# Patient Record
Sex: FEMALE | Race: AMERICAN INDIAN OR ALASKA NATIVE | NOT HISPANIC OR LATINO | URBAN - METROPOLITAN AREA
[De-identification: names, ages, dates, MRNs, and addresses within clinical notes are randomized per-mention and may not be internally consistent; named-entity substitution may affect disease eponyms.]

---

## 2024-01-12 ENCOUNTER — OFFICE VISIT (OUTPATIENT)
Dept: FAMILY MEDICINE | Facility: CLINIC | Age: 39
End: 2024-01-12
Payer: COMMERCIAL

## 2024-01-12 VITALS
SYSTOLIC BLOOD PRESSURE: 124 MMHG | DIASTOLIC BLOOD PRESSURE: 79 MMHG | OXYGEN SATURATION: 96 % | TEMPERATURE: 98.6 F | HEART RATE: 73 BPM | WEIGHT: 235.4 LBS | BODY MASS INDEX: 37.83 KG/M2 | RESPIRATION RATE: 16 BRPM | HEIGHT: 66 IN

## 2024-01-12 DIAGNOSIS — F15.10 METHAMPHETAMINE USE (H): ICD-10-CM

## 2024-01-12 DIAGNOSIS — N89.8 VAGINAL DISCHARGE: ICD-10-CM

## 2024-01-12 DIAGNOSIS — Z11.3 ROUTINE SCREENING FOR STI (SEXUALLY TRANSMITTED INFECTION): ICD-10-CM

## 2024-01-12 DIAGNOSIS — R07.0 THROAT PAIN: Primary | ICD-10-CM

## 2024-01-12 LAB
BACTERIAL VAGINOSIS VAG-IMP: NEGATIVE
BASOPHILS # BLD AUTO: 0 10E3/UL (ref 0–0.2)
BASOPHILS NFR BLD AUTO: 0 %
CANDIDA DNA VAG QL NAA+PROBE: NOT DETECTED
CANDIDA GLABRATA / CANDIDA KRUSEI DNA: NOT DETECTED
DEPRECATED S PYO AG THROAT QL EIA: NEGATIVE
EOSINOPHIL # BLD AUTO: 0 10E3/UL (ref 0–0.7)
EOSINOPHIL NFR BLD AUTO: 1 %
ERYTHROCYTE [DISTWIDTH] IN BLOOD BY AUTOMATED COUNT: 15.2 % (ref 10–15)
GROUP A STREP BY PCR: NOT DETECTED
HCT VFR BLD AUTO: 43 % (ref 35–47)
HGB BLD-MCNC: 13.5 G/DL (ref 11.7–15.7)
IMM GRANULOCYTES # BLD: 0 10E3/UL
IMM GRANULOCYTES NFR BLD: 0 %
LYMPHOCYTES # BLD AUTO: 1.4 10E3/UL (ref 0.8–5.3)
LYMPHOCYTES NFR BLD AUTO: 28 %
MCH RBC QN AUTO: 27.3 PG (ref 26.5–33)
MCHC RBC AUTO-ENTMCNC: 31.4 G/DL (ref 31.5–36.5)
MCV RBC AUTO: 87 FL (ref 78–100)
MONOCYTES # BLD AUTO: 0.4 10E3/UL (ref 0–1.3)
MONOCYTES NFR BLD AUTO: 9 %
NEUTROPHILS # BLD AUTO: 3 10E3/UL (ref 1.6–8.3)
NEUTROPHILS NFR BLD AUTO: 62 %
NRBC # BLD AUTO: 0 10E3/UL
NRBC BLD AUTO-RTO: 0 /100
PLATELET # BLD AUTO: 242 10E3/UL (ref 150–450)
RBC # BLD AUTO: 4.94 10E6/UL (ref 3.8–5.2)
T VAGINALIS DNA VAG QL NAA+PROBE: NOT DETECTED
WBC # BLD AUTO: 4.9 10E3/UL (ref 4–11)

## 2024-01-12 PROCEDURE — 87491 CHLMYD TRACH DNA AMP PROBE: CPT | Mod: ORL | Performed by: NURSE PRACTITIONER

## 2024-01-12 PROCEDURE — 87389 HIV-1 AG W/HIV-1&-2 AB AG IA: CPT | Mod: ORL | Performed by: NURSE PRACTITIONER

## 2024-01-12 PROCEDURE — 87651 STREP A DNA AMP PROBE: CPT | Mod: ORL | Performed by: NURSE PRACTITIONER

## 2024-01-12 PROCEDURE — 80053 COMPREHEN METABOLIC PANEL: CPT | Mod: ORL | Performed by: NURSE PRACTITIONER

## 2024-01-12 PROCEDURE — 85025 COMPLETE CBC W/AUTO DIFF WBC: CPT | Mod: ORL | Performed by: NURSE PRACTITIONER

## 2024-01-12 PROCEDURE — 86780 TREPONEMA PALLIDUM: CPT | Mod: ORL | Performed by: NURSE PRACTITIONER

## 2024-01-12 PROCEDURE — 87635 SARS-COV-2 COVID-19 AMP PRB: CPT | Mod: ORL | Performed by: NURSE PRACTITIONER

## 2024-01-12 PROCEDURE — 0352U MULTIPLEX VAGINAL PANEL BY PCR: CPT | Mod: ORL | Performed by: NURSE PRACTITIONER

## 2024-01-12 PROCEDURE — 87591 N.GONORRHOEAE DNA AMP PROB: CPT | Mod: ORL | Performed by: NURSE PRACTITIONER

## 2024-01-12 RX ORDER — ACETAMINOPHEN 160 MG
1 TABLET,DISINTEGRATING ORAL DAILY
COMMUNITY

## 2024-01-12 RX ORDER — MULTIVITAMIN,THERAPEUTIC
1 TABLET ORAL DAILY
COMMUNITY

## 2024-01-12 RX ORDER — BUPROPION HYDROCHLORIDE 150 MG/1
150 TABLET ORAL EVERY MORNING
COMMUNITY
End: 2024-04-24

## 2024-01-12 RX ORDER — NALTREXONE HYDROCHLORIDE 50 MG/1
50 TABLET, FILM COATED ORAL DAILY
COMMUNITY

## 2024-01-12 RX ORDER — TRAZODONE HYDROCHLORIDE 50 MG/1
1-3 TABLET, FILM COATED ORAL
COMMUNITY
End: 2024-04-24

## 2024-01-12 RX ORDER — IBUPROFEN 200 MG
400 TABLET ORAL EVERY 6 HOURS PRN
Qty: 30 TABLET | Refills: 1 | Status: SHIPPED | OUTPATIENT
Start: 2024-01-12 | End: 2024-04-24

## 2024-01-12 ASSESSMENT — PATIENT HEALTH QUESTIONNAIRE - PHQ9
SUM OF ALL RESPONSES TO PHQ QUESTIONS 1-9: 10
5. POOR APPETITE OR OVEREATING: MORE THAN HALF THE DAYS

## 2024-01-12 ASSESSMENT — ENCOUNTER SYMPTOMS
SHORTNESS OF BREATH: 0
FEVER: 1
ABDOMINAL PAIN: 0
CHILLS: 0
NAUSEA: 0
SORE THROAT: 1
VOMITING: 0
FATIGUE: 1
PALPITATIONS: 0
DIARRHEA: 0
SINUS PRESSURE: 1
COUGH: 0

## 2024-01-12 ASSESSMENT — ANXIETY QUESTIONNAIRES
5. BEING SO RESTLESS THAT IT IS HARD TO SIT STILL: MORE THAN HALF THE DAYS
6. BECOMING EASILY ANNOYED OR IRRITABLE: NOT AT ALL
7. FEELING AFRAID AS IF SOMETHING AWFUL MIGHT HAPPEN: NOT AT ALL
3. WORRYING TOO MUCH ABOUT DIFFERENT THINGS: MORE THAN HALF THE DAYS
GAD7 TOTAL SCORE: 8
GAD7 TOTAL SCORE: 8
2. NOT BEING ABLE TO STOP OR CONTROL WORRYING: MORE THAN HALF THE DAYS
IF YOU CHECKED OFF ANY PROBLEMS ON THIS QUESTIONNAIRE, HOW DIFFICULT HAVE THESE PROBLEMS MADE IT FOR YOU TO DO YOUR WORK, TAKE CARE OF THINGS AT HOME, OR GET ALONG WITH OTHER PEOPLE: SOMEWHAT DIFFICULT
1. FEELING NERVOUS, ANXIOUS, OR ON EDGE: NOT AT ALL

## 2024-01-12 ASSESSMENT — PAIN SCALES - GENERAL: PAINLEVEL: NO PAIN (0)

## 2024-01-12 NOTE — LETTER
January 15, 2024      Hailey Peng  [address] [address]        Dear ,    We are writing to inform you of your test results.    Your strep and COVID were negative. Please follow-up if your sore throat does not resolve/improve after 10 days.    Your vaginal discharge tests came back negative. Please follow-up if symptoms persist/fail to improve.    The rest of your test results fall within the expected range(s) or remain unchanged from previous results.  Please continue with current treatment plan.    Resulted Orders   Streptococcus A Rapid Screen w/Reflex to PCR   Result Value Ref Range    Group A Strep antigen Negative Negative   Group A Streptococcus PCR Throat Swab   Result Value Ref Range    Group A strep by PCR Not Detected Not Detected    Narrative    The Xpert Xpress Strep A test, performed on the [employer] Systems, is a rapid, qualitative in vitro diagnostic test for the detection of Streptococcus pyogenes (Group A ß-hemolytic Streptococcus, Strep A) in throat swab specimens from patients with signs and symptoms of pharyngitis. The Xpert Xpress Strep A test can be used as an aid in the diagnosis of Group A Streptococcal pharyngitis. The assay is not intended to monitor treatment for Group A Streptococcus infections. The Xpert Xpress Strep A test utilizes an automated real-time polymerase chain reaction (PCR) to detect Streptococcus pyogenes DNA.   Symptomatic COVID-19 Virus (Coronavirus) by PCR Nose   Result Value Ref Range    SARS CoV2 PCR Negative Negative      Comment:      NEGATIVE: SARS-CoV-2 (COVID-19) RNA not detected, presumed negative.    Narrative    Testing was performed using the alfredo SARS-CoV-2 assay on the alfredo  6800 System. This test should be ordered for the detection of  SARS-CoV-2 in individuals who meet SARS-CoV-2 clinical and/or  epidemiological criteria. Test performance is unknown in asymptomatic  patients. This test is for in vitro  diagnostic use under the FDA EUA  for laboratories certified under CLIA to perform high and/or moderate  complexity testing. This test has not been FDA cleared or approved. A  negative result does not rule out the presence of PCR inhibitors in  the specimen or target RNA in concentration below the limit of  detection for the assay. The possibility of a false negative should  be considered if the patient's recent exposure or clinical  presentation suggests COVID-19. This test was validated by the [facility]. This  laboratory is certified under the Clinical Laboratory Improvement  Amendments of 1988 (CLIA-88) as qualified to perform high and/or  moderate complexity laboratory testing.   CHLAMYDIA TRACHOMATIS PCR   Result Value Ref Range    Chlamydia trachomatis Negative Negative      Comment:      A negative result by transcription mediated amplification does not preclude the presence of C. trachomatis infection because results are dependent on proper and adequate collection, absence of inhibitors and sufficient rRNA to be detected.   NEISSERIA GONORRHOEA PCR   Result Value Ref Range    Neisseria gonorrhoeae Negative Negative      Comment:      Negative for N. gonorrhoeae rRNA by transcription mediated amplification. A negative result by transcription mediated amplification does not preclude the presence of C. trachomatis infection because results are dependent on proper and adequate collection, absence of inhibitors and sufficient rRNA to be detected.   HIV Antigen Antibody Combo   Result Value Ref Range    HIV Antigen Antibody Combo Nonreactive Nonreactive      Comment:      Negative HIV-1/-2 antigen and antibody screening test results usually indicate the absence of HIV-1 and HIV-2 infection. However, such negative results do not rule-out acute HIV infection.  If acute HIV-1 or HIV-2 infection is suspected, detection of HIV-1 or HIV-2 RNA  is recommended.     Treponema Abs w Reflex to RPR and Titer   Result Value Ref Range    Treponema Antibody Total Nonreactive Nonreactive   Comprehensive metabolic panel   Result Value Ref Range    Sodium 141 135 - 145 mmol/L      Comment:      Reference intervals for this test were updated on 09/26/2023 to more accurately reflect our healthy population. There may be differences in the flagging of prior results with similar values performed with this method. Interpretation of those prior results can be made in the context of the updated reference intervals.     Potassium 4.1 3.4 - 5.3 mmol/L    Carbon Dioxide (CO2) 26 22 - 29 mmol/L    Anion Gap 11 7 - 15 mmol/L    Urea Nitrogen 13.9 6.0 - 20.0 mg/dL    Creatinine 0.82 0.51 - 0.95 mg/dL    GFR Estimate >90 >60 mL/min/1.73m2    Calcium 8.7 8.6 - 10.0 mg/dL    Chloride 104 98 - 107 mmol/L    Glucose 94 70 - 99 mg/dL    Alkaline Phosphatase 63 40 - 150 U/L      Comment:      Reference intervals for this test were updated on 11/14/2023 to more accurately reflect our healthy population. There may be differences in the flagging of prior results with similar values performed with this method. Interpretation of those prior results can be made in the context of the updated reference intervals.    AST 19 0 - 45 U/L      Comment:      Reference intervals for this test were updated on 6/12/2023 to more accurately reflect our healthy population. There may be differences in the flagging of prior results with similar values performed with this method. Interpretation of those prior results can be made in the context of the updated reference intervals.    ALT 14 0 - 50 U/L      Comment:      Reference intervals for this test were updated on 6/12/2023 to more accurately reflect our healthy population. There may be differences in the flagging of prior results with similar values performed with this method. Interpretation of those prior results can be made in the context of the updated reference  intervals.      Protein Total 7.1 6.4 - 8.3 g/dL    Albumin 4.1 3.5 - 5.2 g/dL    Bilirubin Total <0.2 <=1.2 mg/dL   CBC with platelets and differential   Result Value Ref Range    WBC Count 4.9 4.0 - 11.0 10e3/uL    RBC Count 4.94 3.80 - 5.20 10e6/uL    Hemoglobin 13.5 11.7 - 15.7 g/dL    Hematocrit 43.0 35.0 - 47.0 %    MCV 87 78 - 100 fL    MCH 27.3 26.5 - 33.0 pg    MCHC 31.4 (L) 31.5 - 36.5 g/dL    RDW 15.2 (H) 10.0 - 15.0 %    Platelet Count 242 150 - 450 10e3/uL    % Neutrophils 62 %    % Lymphocytes 28 %    % Monocytes 9 %    % Eosinophils 1 %    % Basophils 0 %    % Immature Granulocytes 0 %    NRBCs per 100 WBC 0 <1 /100    Absolute Neutrophils 3.0 1.6 - 8.3 10e3/uL    Absolute Lymphocytes 1.4 0.8 - 5.3 10e3/uL    Absolute Monocytes 0.4 0.0 - 1.3 10e3/uL    Absolute Eosinophils 0.0 0.0 - 0.7 10e3/uL    Absolute Basophils 0.0 0.0 - 0.2 10e3/uL    Absolute Immature Granulocytes 0.0 <=0.4 10e3/uL    Absolute NRBCs 0.0 10e3/uL   Multiplex Vaginal Panel by PCR   Result Value Ref Range    Bacterial Vaginosis Organism DNA Negative Negative      Comment:      Indicator DNA target(s) related to bacterial vaginosis organisms is/are not detected.  Organisms associated with bacterial vaginosis that are targeted in this assay include Atopobium spp., Bacterial Vaginosis-Associated Bacterium-2, and Megasphaera-1. Detected organisms are not reported individually.    Candida Group DNA Not Detected Not Detected      Comment:      Candida group species detected by this target include C. albicans, C. tropicalis, C. parapsilosis, C. dubliniensis.     Ankita glabrata / Ankita krusei DNA Not Detected Not Detected    Trichomonas vaginalis DNA Not Detected Not Detected    Narrative    The Xpert  Xpress MVP test, performed on the [employer] Systems, is an automated, qualitative in vitro diagnostic test for the detection of DNA targets from anaerobic bacteria associated with bacterial vaginosis, Candida species  associated with vulvovaginal candidiasis, and Trichomonas vaginalis. The assay uses clinician-collected and self-collected vaginal swabs from patients who are symptomatic for vaginitis/ vaginosis. The Xpert  Xpress MVP test utilizes real-time polymerase chain reaction (PCR) for the amplification of specific DNA targets and utilizes fluorogenic target-specific hybridization probes to detect and differentiate DNA. It is intended to aid in the diagnosis of vaginal infections in women with a clinical presentation consistent with bacterial vaginosis, vulvovaginal candidiasis, or trichomoniasis.   The assay targets three anaerobic microorgansims that are associated with bacterial vaginosis (BV). Other organisms that are not detected by the Xpert  Xpress MVP test have also been reported to be associated with BV. The BV organism and Candida species targets of the Xpert  Xpress MVP test can be commensal in women; positive results must be considered in conjunction with other clinical and patient information to determine the disease status.       If you have any questions or concerns, please call the clinic at the number listed above.       Sincerely,      CHRIS Bach CNP

## 2024-01-12 NOTE — PROGRESS NOTES
Depression Response    Patient completed the PHQ-9 assessment for depression and scored >9? Yes  Question 9 on the PHQ-9 was positive for suicidality? No  Does patient have current mental health provider? Yes    Is this a virtual visit? No    I personally notified the following: visit provider and clinic nurse

## 2024-01-12 NOTE — NURSING NOTE
"ROOM:1  LARA DUNNE    Preferred Name: Hailey     How did you hear about us?  Other - [facility]    38 year old  Chief Complaint   Patient presents with     Pharyngitis     Patient believes they might have strep throat. The symptoms started yesterday (sore throat, cough)     Vaginal Problem     Patient was tested for trichomoniasis three weeks ago and she took antibiotics for it. The patient finished taking the antibiotics, but a few days ago they noticed thick white \"chalky\" discharge coming from their vaginal area.        Blood pressure 124/79, pulse 73, temperature 98.6  F (37  C), temperature source Oral, resp. rate 16, height 1.676 m (5' 6\"), weight 106.8 kg (235 lb 6.4 oz), SpO2 96%. Body mass index is 37.99 kg/m .  BP completed using cuff size:        There is no problem list on file for this patient.      Wt Readings from Last 2 Encounters:   01/12/24 106.8 kg (235 lb 6.4 oz)     BP Readings from Last 3 Encounters:   01/12/24 124/79       Allergies   Allergen Reactions     Wool Fiber        Current Outpatient Medications   Medication     buPROPion (WELLBUTRIN XL) 150 MG 24 hr tablet     Cholecalciferol (VITAMIN D3) 50 MCG (2000 UT) CAPS     multivitamin, therapeutic (THERA-VIT) TABS tablet     naltrexone (DEPADE/REVIA) 50 MG tablet     traZODone (DESYREL) 50 MG tablet     No current facility-administered medications for this visit.       Social History     Tobacco Use     Smoking status: Former     Types: Cigarettes     Smokeless tobacco: Never   Vaping Use     Vaping Use: Former   Substance Use Topics     Alcohol use: Not Currently     Drug use: Not Currently       Honoring Choices - Health Care Directive Guide offered to patient at time of visit.    There are no preventive care reminders to display for this patient.      There is no immunization history on file for this patient.    No results found for: \"PAP\"    No lab results found.         No data to display                    1/12/2024     9:42 AM "   PHQ-9 SCORE   PHQ-9 Total Score 10           1/12/2024     9:42 AM   ROSIBEL-7 SCORE   Total Score 8            No data to display                Bhavana Ricci    January 12, 2024 9:54 AM

## 2024-01-12 NOTE — PROGRESS NOTES
Depression Response    Patient completed the PHQ-9 assessment for depression and scored >9? Yes  Question 9 on the PHQ-9 was positive for suicidality? No  Does patient have current mental health provider? Yes    Is this a virtual visit? No    I personally notified the following: visit provider

## 2024-01-13 LAB
ALBUMIN SERPL BCG-MCNC: 4.1 G/DL (ref 3.5–5.2)
ALP SERPL-CCNC: 63 U/L (ref 40–150)
ALT SERPL W P-5'-P-CCNC: 14 U/L (ref 0–50)
ANION GAP SERPL CALCULATED.3IONS-SCNC: 11 MMOL/L (ref 7–15)
AST SERPL W P-5'-P-CCNC: 19 U/L (ref 0–45)
BILIRUB SERPL-MCNC: <0.2 MG/DL
BUN SERPL-MCNC: 13.9 MG/DL (ref 6–20)
C TRACH DNA SPEC QL NAA+PROBE: NEGATIVE
CALCIUM SERPL-MCNC: 8.7 MG/DL (ref 8.6–10)
CHLORIDE SERPL-SCNC: 104 MMOL/L (ref 98–107)
CREAT SERPL-MCNC: 0.82 MG/DL (ref 0.51–0.95)
DEPRECATED HCO3 PLAS-SCNC: 26 MMOL/L (ref 22–29)
EGFRCR SERPLBLD CKD-EPI 2021: >90 ML/MIN/1.73M2
GLUCOSE SERPL-MCNC: 94 MG/DL (ref 70–99)
HIV 1+2 AB+HIV1 P24 AG SERPL QL IA: NONREACTIVE
N GONORRHOEA DNA SPEC QL NAA+PROBE: NEGATIVE
POTASSIUM SERPL-SCNC: 4.1 MMOL/L (ref 3.4–5.3)
PROT SERPL-MCNC: 7.1 G/DL (ref 6.4–8.3)
SARS-COV-2 RNA RESP QL NAA+PROBE: NEGATIVE
SODIUM SERPL-SCNC: 141 MMOL/L (ref 135–145)
T PALLIDUM AB SER QL: NONREACTIVE

## 2024-01-15 NOTE — RESULT ENCOUNTER NOTE
Can someone let this patient's RN at [facility] know that her vaginal discharge workup was negative? I will also draft a letter.

## 2024-02-01 NOTE — PROGRESS NOTES
"[place] [facility] PHYSICAL EXAMINATION FORM    Patient: Hailey Peng    YOB: 1985  Sex:  female    Date of Exam: 2/02/24    Arrival Time: 01 13 PM  Departure Time: 02 17 PM    Charge Phone (written on paperwork): [phone]    Vitals: /74 (BP Location: Left arm, Patient Position: Sitting, Cuff Size: Adult Large)   Pulse 87   Temp 98.4  F (36.9  C) (Oral)   Resp 16   Ht 1.669 m (5' 5.7\")   Wt 111.2 kg (245 lb 3.2 oz)   SpO2 95%   BMI 39.94 kg/m        Patient Concerns: Physical        HPI:    38-year-old female with past medical history anxiety/depression and methamphetamine use presents for [facility] intake physical(patient transitioning to long term program). Patient denies acute concerns/symptoms at time of exam.        PHQ-2 Score:            2/2/2024     1:19 PM   PHQ-2 ( 1999 Pfizer)   Q1: Little interest or pleasure in doing things 0   Q2: Feeling down, depressed or hopeless 0   PHQ-2 Score 0   Q1: Little interest or pleasure in doing things Not at all   Q2: Feeling down, depressed or hopeless Not at all   PHQ-2 Score 0       PHQ-9 score:        1/12/2024     9:42 AM   PHQ   PHQ-9 Total Score 10   Q9: Thoughts of better off dead/self-harm past 2 weeks Not at all       Medications:   Current Outpatient Medications   Medication    acetaminophen (TYLENOL) 500 MG tablet    atomoxetine (STRATTERA) 40 MG capsule    buPROPion (WELLBUTRIN XL) 150 MG 24 hr tablet    Cholecalciferol (VITAMIN D3) 50 MCG (2000 UT) CAPS    ibuprofen (ADVIL/MOTRIN) 200 MG tablet    ibuprofen (ADVIL/MOTRIN) 800 MG tablet    multivitamin, therapeutic (THERA-VIT) TABS tablet    naltrexone (DEPADE/REVIA) 50 MG tablet    traZODone (DESYREL) 50 MG tablet     No current facility-administered medications for this visit.       ROS:  Review of Systems   Constitutional: Negative for chills, fatigue and fever.   HENT: Negative for congestion and sore throat.    Respiratory: Negative for cough and shortness of " breath.    Cardiovascular: Negative for chest pain and palpitations.   Gastrointestinal: Negative for abdominal pain, diarrhea, nausea and vomiting.   Breasts:  Negative for tenderness, breast mass and discharge.   Genitourinary: Negative for dysuria and vaginal discharge.   Neurological: Negative for dizziness and headaches.   Psychiatric/Behavioral: Negative for dysphoric mood. The patient is not nervous/anxious.      Constitutional, HEENT, cardiovascular, pulmonary, gi and gu systems are negative, except as otherwise noted.        HM:  Pap- >3 years  Mammogram- not done. Denies FHX breast cancer.  Colon cancer- Denies FHX. Never had a colonoscopy.  TDAP-2021  Influenza- Declined today.        General Physical Exam:    Physical Exam  Exam conducted with a chaperone present.   Constitutional:       General: She is not in acute distress.     Appearance: She is not ill-appearing.   HENT:      Right Ear: Tympanic membrane normal.      Left Ear: Tympanic membrane normal.      Nose: No rhinorrhea.      Mouth/Throat:      Mouth: Mucous membranes are moist.      Pharynx: Oropharynx is clear.   Eyes:      Extraocular Movements: Extraocular movements intact.      Pupils: Pupils are equal, round, and reactive to light.   Cardiovascular:      Rate and Rhythm: Normal rate and regular rhythm.      Heart sounds: No murmur heard.  Pulmonary:      Effort: Pulmonary effort is normal. No respiratory distress.      Breath sounds: Normal breath sounds. No wheezing or rales.   Chest:   Breasts:     Right: No mass.      Left: No mass.   Abdominal:      General: Bowel sounds are normal.      Palpations: Abdomen is soft.      Tenderness: There is no abdominal tenderness.   Genitourinary:     Cervix: Cervical bleeding present.      Comments: Unable to visualize cervical os.  Musculoskeletal:      Cervical back: Neck supple.      Right lower leg: No edema.      Left lower leg: No edema.   Lymphadenopathy:      Cervical: No cervical  adenopathy.      Upper Body:      Right upper body: No axillary adenopathy.      Left upper body: No axillary adenopathy.   Skin:     General: Skin is warm.   Neurological:      General: No focal deficit present.      Mental Status: She is alert.   Psychiatric:         Thought Content: Thought content normal.         Judgment: Judgment normal.           All labs required for [facility] will be completed during this visit: HIV, Hepatitis A (IgM &IgG), Hepatitis B (IgM &IgG), Hepatitis C, Quantiferon Gold, Pregnancy Test     Allergies:   Allergies   Allergen Reactions    Wool Fiber        Are there any conditions that may endanger the health of the staff or Clients in our residential program? No     Is there any reason why this applicant should not assist in the preparation of food? No    This applicant is okay to admit for services to [facility]? Yes     The above client is a mutual patient at [facility], and the [facility] Practitioners Clinic would like our patient to continue taking her medication as prescribed upon discharging from [facility].     I authorize the above patient to take all of her medication with her upon discharging from [facility]. Yes     Diagnoses:     1. Healthcare maintenance  Pap- >3 years- unable to complete today.  Mammogram- not done. Denies FHX breast cancer. Start at 41.  Colon cancer- Denies FHX. Never had a colonoscopy.  TDAP-2021  Influenza- Declined today.    2. Lives in group home  Per [facility] program requirements.  - HCG Qualitative Urine (UPT) ([facility])  - Hepatitis C Screen Reflex to HCV RNA Quant and Genotype  - Hepatitis A antibody IgM  - Hepatitis A Antibody Total  - Hepatitis B Surface Antibody  - Hepatitis B core antibody  - Quantiferon TB Gold Plus  - HIV Antigen Antibody Combo    3. Screening for cervical cancer  Unable to visualize cervical os.  There was  some cervical bleeding, patient believes her period is about to start.  Will refer to OB/GYN to complete Pap screen  - Pap screen with HPV - recommended age 30 - 65 years  - Ob/Gyn  Referral; Future    4. Screening for lipid disorders  - Lipid panel      Medication Changes: MEDICATIONS:        - Continue other medications without change    Referrals   Referral to OB/GYN - Please call [phone] to schedule your appointment    Follow up plan   Follow up as needed    All questions/concerns addressed. Patient stated understanding/agreement to plan of care.    CHRIS Berg, CNP  [employer]    Note: Chart documentation was done in part with Dragon Voice Recognition software.  Although reviewed after completion, some word and grammatical errors may remain. Please contact author for any clarification or concerns.      Fax completed forms to: [phone]

## 2024-02-02 ENCOUNTER — OFFICE VISIT (OUTPATIENT)
Dept: FAMILY MEDICINE | Facility: CLINIC | Age: 39
End: 2024-02-02
Payer: COMMERCIAL

## 2024-02-02 VITALS
HEART RATE: 87 BPM | TEMPERATURE: 98.4 F | OXYGEN SATURATION: 95 % | RESPIRATION RATE: 16 BRPM | WEIGHT: 245.2 LBS | HEIGHT: 66 IN | BODY MASS INDEX: 39.41 KG/M2 | DIASTOLIC BLOOD PRESSURE: 74 MMHG | SYSTOLIC BLOOD PRESSURE: 111 MMHG

## 2024-02-02 DIAGNOSIS — Z13.220 SCREENING FOR LIPID DISORDERS: ICD-10-CM

## 2024-02-02 DIAGNOSIS — F15.11 METHAMPHETAMINE ABUSE IN REMISSION (H): ICD-10-CM

## 2024-02-02 DIAGNOSIS — Z00.00 HEALTHCARE MAINTENANCE: Primary | ICD-10-CM

## 2024-02-02 DIAGNOSIS — Z12.4 SCREENING FOR CERVICAL CANCER: ICD-10-CM

## 2024-02-02 DIAGNOSIS — Z78.9 LIVES IN GROUP HOME: ICD-10-CM

## 2024-02-02 LAB
HCG UR QL: NEGATIVE
HCV AB SERPL QL IA: NONREACTIVE

## 2024-02-02 PROCEDURE — 86708 HEPATITIS A ANTIBODY: CPT | Mod: ORL | Performed by: NURSE PRACTITIONER

## 2024-02-02 PROCEDURE — 87389 HIV-1 AG W/HIV-1&-2 AB AG IA: CPT | Mod: ORL | Performed by: NURSE PRACTITIONER

## 2024-02-02 PROCEDURE — 80061 LIPID PANEL: CPT | Mod: ORL | Performed by: NURSE PRACTITIONER

## 2024-02-02 PROCEDURE — 86706 HEP B SURFACE ANTIBODY: CPT | Mod: ORL | Performed by: NURSE PRACTITIONER

## 2024-02-02 PROCEDURE — 86704 HEP B CORE ANTIBODY TOTAL: CPT | Mod: ORL | Performed by: NURSE PRACTITIONER

## 2024-02-02 PROCEDURE — 86803 HEPATITIS C AB TEST: CPT | Mod: ORL | Performed by: NURSE PRACTITIONER

## 2024-02-02 PROCEDURE — 86481 TB AG RESPONSE T-CELL SUSP: CPT | Mod: ORL | Performed by: NURSE PRACTITIONER

## 2024-02-02 PROCEDURE — 86709 HEPATITIS A IGM ANTIBODY: CPT | Mod: ORL | Performed by: NURSE PRACTITIONER

## 2024-02-02 RX ORDER — IBUPROFEN 800 MG/1
800 TABLET, FILM COATED ORAL 3 TIMES DAILY PRN
COMMUNITY

## 2024-02-02 RX ORDER — ACETAMINOPHEN 500 MG
500-1000 TABLET ORAL 3 TIMES DAILY PRN
COMMUNITY

## 2024-02-02 RX ORDER — ATOMOXETINE 40 MG/1
40 CAPSULE ORAL DAILY
COMMUNITY
End: 2024-04-24

## 2024-02-02 SDOH — HEALTH STABILITY: PHYSICAL HEALTH: ON AVERAGE, HOW MANY DAYS PER WEEK DO YOU ENGAGE IN MODERATE TO STRENUOUS EXERCISE (LIKE A BRISK WALK)?: 5 DAYS

## 2024-02-02 ASSESSMENT — SOCIAL DETERMINANTS OF HEALTH (SDOH)
WITHIN THE LAST YEAR, HAVE YOU BEEN KICKED, HIT, SLAPPED, OR OTHERWISE PHYSICALLY HURT BY YOUR PARTNER OR EX-PARTNER?: YES
HOW OFTEN DO YOU GET TOGETHER WITH FRIENDS OR RELATIVES?: MORE THAN THREE TIMES A WEEK
WITHIN THE LAST YEAR, HAVE TO BEEN RAPED OR FORCED TO HAVE ANY KIND OF SEXUAL ACTIVITY BY YOUR PARTNER OR EX-PARTNER?: NO
WITHIN THE LAST YEAR, HAVE YOU BEEN AFRAID OF YOUR PARTNER OR EX-PARTNER?: NO
WITHIN THE LAST YEAR, HAVE YOU BEEN HUMILIATED OR EMOTIONALLY ABUSED IN OTHER WAYS BY YOUR PARTNER OR EX-PARTNER?: YES

## 2024-02-02 ASSESSMENT — ENCOUNTER SYMPTOMS
DIARRHEA: 0
BREAST MASS: 0
CHILLS: 0
NAUSEA: 0
PALPITATIONS: 0
DIZZINESS: 0
COUGH: 0
SHORTNESS OF BREATH: 0
FATIGUE: 0
FEVER: 0
HEADACHES: 0
DYSPHORIC MOOD: 0
SORE THROAT: 0
ABDOMINAL PAIN: 0
DYSURIA: 0
NERVOUS/ANXIOUS: 0
VOMITING: 0

## 2024-02-02 ASSESSMENT — PAIN SCALES - GENERAL: PAINLEVEL: NO PAIN (0)

## 2024-02-02 ASSESSMENT — ANXIETY QUESTIONNAIRES
GAD7 TOTAL SCORE: 2
6. BECOMING EASILY ANNOYED OR IRRITABLE: NOT AT ALL
5. BEING SO RESTLESS THAT IT IS HARD TO SIT STILL: SEVERAL DAYS
1. FEELING NERVOUS, ANXIOUS, OR ON EDGE: NOT AT ALL
IF YOU CHECKED OFF ANY PROBLEMS ON THIS QUESTIONNAIRE, HOW DIFFICULT HAVE THESE PROBLEMS MADE IT FOR YOU TO DO YOUR WORK, TAKE CARE OF THINGS AT HOME, OR GET ALONG WITH OTHER PEOPLE: NOT DIFFICULT AT ALL
GAD7 TOTAL SCORE: 2
7. FEELING AFRAID AS IF SOMETHING AWFUL MIGHT HAPPEN: NOT AT ALL
8. IF YOU CHECKED OFF ANY PROBLEMS, HOW DIFFICULT HAVE THESE MADE IT FOR YOU TO DO YOUR WORK, TAKE CARE OF THINGS AT HOME, OR GET ALONG WITH OTHER PEOPLE?: NOT DIFFICULT AT ALL
3. WORRYING TOO MUCH ABOUT DIFFERENT THINGS: NOT AT ALL
4. TROUBLE RELAXING: SEVERAL DAYS
7. FEELING AFRAID AS IF SOMETHING AWFUL MIGHT HAPPEN: NOT AT ALL
2. NOT BEING ABLE TO STOP OR CONTROL WORRYING: NOT AT ALL

## 2024-02-02 NOTE — PROGRESS NOTES
"  {PROVIDER CHARTING PREFERENCE:898611}    Subjective   Hailey is a 38 year old, presenting for the following health issues:  Physical  {(!) Visit Details have not yet been documented.  Please enter Visit Details and then use this list to pull in documentation. (Optional):309310}  HPI     {MA/LPN/RN Pre-Provider Visit Orders- hCG/UA/Strep (Optional):229014}  {SUPERLIST (Optional):145950}  {additonal problems for provider to add (Optional):308808}    {ROS Picklists (Optional):343231}      Objective    /74 (BP Location: Left arm, Patient Position: Sitting, Cuff Size: Adult Large)   Pulse 87   Temp 98.4  F (36.9  C) (Oral)   Resp 16   Ht 1.669 m (5' 5.7\")   Wt 111.2 kg (245 lb 3.2 oz)   SpO2 95%   BMI 39.94 kg/m    Body mass index is 39.94 kg/m .  Physical Exam   {Exam List (Optional):413925}    {Diagnostic Test Results (Optional):306601}        Signed Electronically by: CHRIS Berg CNP  {Email feedback regarding this note to [email or web address]   :577062}  "
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
## 2024-02-02 NOTE — LETTER
February 5, 2024      Hailey Peng  [address] [address]    Dear MsTomasz,    We are writing to inform you of your test results.    Please consider Hepatitis A and B vaccines which can protect your liver from future injury.     The rest of your test results fall within the expected range(s) or remain unchanged from previous results.  Please continue with current treatment plan.    Resulted Orders   HCG Qualitative Urine (UPT) ([facility])   Result Value Ref Range    hCG Urine Qualitative Negative Negative      Comment:      This test is for screening purposes.  Results should be interpreted along with the clinical picture.  Confirmation testing is available if warranted by ordering [id number], HCG Quantitative Pregnancy.   Hepatitis C Screen Reflex to HCV RNA Quant and Genotype   Result Value Ref Range    Hepatitis C Antibody Nonreactive Nonreactive      Comment:      A nonreactive screening test result does not exclude the possibility of exposure to or infection with HCV. Nonreactive screening test results in individuals with prior exposure to HCV may be due to antibody levels below the limit of detection of this assay or lack of reactivity to the HCV antigens used in this assay. Patients with recent HCV infections (<3 months from time of exposure) may have false-negative HCV antibody results due to the time needed for seroconversion (average of 8 to 9 weeks).   Hepatitis A antibody IgM   Result Value Ref Range    Hepatitis A Antibody IgM Nonreactive Nonreactive      Comment:      Nonreactive results indicate either inadequate or delayed anti-HAV IgM response after known exposure to HAV or absence of acute or recent hepatitis A.   Hepatitis A Antibody Total   Result Value Ref Range    Hepatitis A Antibody Total Nonreactive       Comment:      This assay detects the presence of anti-hepatitis A virus (anti-HAV) total (both IgG and IgM combined).  If clinically indicated, specific testing  for anti-HAV IgM (HAVM / Hepatitis A IgM Antibody, Serum) is necessary to confirm the presence of acute or recent hepatitis A. Please see interpretation guide below.    Narrative    HAV antibody testing interpretation chart:      HAV Total Antibody - NONREACTIVE  HAV IgM - NOT TESTED  Comments: No evidence of vaccination or previous infection; Susceptible to Hepatitis A infection     HAV Total Antibody - REACTIVE   HAV IgM - NOT TESTED  Comments:Consistent with recent or remote Hepatitis A infection or antibody response to HAV vaccination    HAV Total Antibody - REACTIVE  HAV IgM - NONREACTIVE  Comments:Consistent with resolved Hepatitis A infection or antibody response to HAV vaccination    HAV Total Antibody - REACTIVE  HAV IgM - REACTIVE  Comments:Consistent with active Hepatitis A infection   Hepatitis B Surface Antibody   Result Value Ref Range    Hepatitis B Surface Antibody Nonreactive       Comment:      Nonreactive results, defined as anti-HBs levels of less than 8.5 mIU/mL, indicate a lack of recovery from acute or chronic hepatitis B or inadequate immune response to HBV vaccination.    Hepatitis B Surface Antibody Instrument Value <3.50 <8.5 m[IU]/mL   Hepatitis B core antibody   Result Value Ref Range    Hepatitis B Core Antibody Total Nonreactive Nonreactive      Comment:      Nonreactive hepatitis B core antibody test results indicate the absence of exposure to hepatitis B virus and no evidence of recent, past/resolved, or chronic hepatitis B.    HIV Antigen Antibody Combo   Result Value Ref Range    HIV Antigen Antibody Combo Nonreactive Nonreactive      Comment:      Negative HIV-1/-2 antigen and antibody screening test results usually indicate the absence of HIV-1 and HIV-2 infection. However, such negative results do not rule-out acute HIV infection.  If acute HIV-1 or HIV-2 infection is suspected, detection of HIV-1 or HIV-2 RNA  is recommended.    Lipid panel   Result Value Ref Range     Cholesterol 149 <200 mg/dL    Triglycerides 127 <150 mg/dL    Direct Measure HDL 53 >=50 mg/dL    LDL Cholesterol Calculated 71 <=100 mg/dL    Non HDL Cholesterol 96 <130 mg/dL    Patient Fasting > 8hrs? Unknown     Narrative    Cholesterol  Desirable:  <200 mg/dL    Triglycerides  Normal:  Less than 150 mg/dL  Borderline High:  150-199 mg/dL  High:  200-499 mg/dL  Very High:  Greater than or equal to 500 mg/dL    Direct Measure HDL  Female:  Greater than or equal to 50 mg/dL   Male:  Greater than or equal to 40 mg/dL    LDL Cholesterol  Desirable:  <100mg/dL  Above Desirable:  100-129 mg/dL   Borderline High:  130-159 mg/dL   High:  160-189 mg/dL   Very High:  >= 190 mg/dL    Non HDL Cholesterol  Desirable:  130 mg/dL  Above Desirable:  130-159 mg/dL  Borderline High:  160-189 mg/dL  High:  190-219 mg/dL  Very High:  Greater than or equal to 220 mg/dL   Quantiferon TB Gold Plus Grey Tube   Result Value Ref Range    Quantiferon Nil Tube 0.01 IU/mL   Quantiferon TB Gold Plus Green Tube   Result Value Ref Range    Quantiferon TB1 Tube 0.02 IU/mL   Quantiferon TB Gold Plus Yellow Tube   Result Value Ref Range    Quantiferon TB2 Tube 0.01    Quantiferon TB Gold Plus Purple Tube   Result Value Ref Range    Quantiferon Mitogen 10.00 IU/mL   Quantiferon TB Gold Plus   Result Value Ref Range    Quantiferon-TB Gold Plus Negative Negative      Comment:      No interferon gamma response to M.tuberculosis antigens was detected. Infection with M.tuberculosis is unlikely, however a single negative result does not exclude infection. In patients at high risk for infection, a second test should be considered in accordance with the 2017 ATS/IDSA/CDC Clinical Pract  ice Guidelines for Diagnosis of Tuberculosis in Adults and Children     TB1 Ag minus Nil Value 0.01 IU/mL    TB2 Ag minus Nil Value 0.00 IU/mL    Mitogen minus Nil Result 9.99 IU/mL    Nil Result 0.01 IU/mL     If you have any questions or concerns, please call the clinic  at the number listed above.     Sincerely,  CHRIS Bach CNP

## 2024-02-03 LAB
CHOLEST SERPL-MCNC: 149 MG/DL
FASTING STATUS PATIENT QL REPORTED: NORMAL
HAV AB SER QL IA: NONREACTIVE
HAV IGM SERPL QL IA: NONREACTIVE
HBV CORE AB SERPL QL IA: NONREACTIVE
HBV SURFACE AB SERPL IA-ACNC: <3.5 M[IU]/ML
HBV SURFACE AB SERPL IA-ACNC: NONREACTIVE M[IU]/ML
HDLC SERPL-MCNC: 53 MG/DL
HIV 1+2 AB+HIV1 P24 AG SERPL QL IA: NONREACTIVE
LDLC SERPL CALC-MCNC: 71 MG/DL
NONHDLC SERPL-MCNC: 96 MG/DL
TRIGL SERPL-MCNC: 127 MG/DL

## 2024-02-05 LAB
GAMMA INTERFERON BACKGROUND BLD IA-ACNC: 0.01 IU/ML
M TB IFN-G BLD-IMP: NEGATIVE
M TB IFN-G CD4+ BCKGRND COR BLD-ACNC: 9.99 IU/ML
MITOGEN IGNF BCKGRD COR BLD-ACNC: 0 IU/ML
MITOGEN IGNF BCKGRD COR BLD-ACNC: 0.01 IU/ML
QUANTIFERON MITOGEN: 10 IU/ML
QUANTIFERON NIL TUBE: 0.01 IU/ML
QUANTIFERON TB1 TUBE: 0.02 IU/ML
QUANTIFERON TB2 TUBE: 0.01

## 2024-02-23 ENCOUNTER — OFFICE VISIT (OUTPATIENT)
Dept: MIDWIFE SERVICES | Facility: CLINIC | Age: 39
End: 2024-02-23
Attending: ADVANCED PRACTICE MIDWIFE
Payer: COMMERCIAL

## 2024-02-23 VITALS
DIASTOLIC BLOOD PRESSURE: 79 MMHG | WEIGHT: 240 LBS | BODY MASS INDEX: 39.09 KG/M2 | HEART RATE: 97 BPM | TEMPERATURE: 97 F | SYSTOLIC BLOOD PRESSURE: 120 MMHG

## 2024-02-23 DIAGNOSIS — Z12.4 SCREENING FOR CERVICAL CANCER: Primary | ICD-10-CM

## 2024-02-23 PROCEDURE — 99202 OFFICE O/P NEW SF 15 MIN: CPT | Performed by: ADVANCED PRACTICE MIDWIFE

## 2024-02-23 PROCEDURE — G0145 SCR C/V CYTO,THINLAYER,RESCR: HCPCS | Performed by: ADVANCED PRACTICE MIDWIFE

## 2024-02-23 PROCEDURE — 87624 HPV HI-RISK TYP POOLED RSLT: CPT | Performed by: ADVANCED PRACTICE MIDWIFE

## 2024-02-23 NOTE — PROGRESS NOTES
S:  Hailey Peng is 38 year old  who presents today for pap smear only. She had a physical done 2/2 where they attempted a pap smear but unable to see the os well. She was referred here to get that done. She has no questions or concerns about anything. Previous pap smears were done in [facility]. She reports it has been awhile since her last pap smear.   Currently at [facility]. Planning to be there for 1 year. Started in Dec. Going well so far. Likes to read and exercise for fun.     O:  /79   Pulse 97   Temp 97  F (36.1  C)   Wt 108.9 kg (240 lb)   BMI 39.09 kg/m     Patient is well   Pelvic exam: normal vagina and vulva, vaginal discharge described as white, normal cervix without lesions or tenderness, uterus normal size anteverted, adenxa normal in size without tenderness, pap smear done.     A:  Pap smear, routine    P:  (Z12.4) Screening for cervical cancer  (primary encounter diagnosis)  Plan: Pap imaged thin layer screen with HPV -         recommended age 30 - 65 years (select HPV order        below)    CHRIS Hartman CNM

## 2024-02-28 LAB
BKR LAB AP GYN ADEQUACY: NORMAL
BKR LAB AP GYN INTERPRETATION: NORMAL
BKR LAB AP HPV REFLEX: NORMAL
BKR LAB AP PREVIOUS ABNORMAL: NORMAL
PATH REPORT.COMMENTS IMP SPEC: NORMAL
PATH REPORT.COMMENTS IMP SPEC: NORMAL
PATH REPORT.RELEVANT HX SPEC: NORMAL

## 2024-03-01 LAB
HUMAN PAPILLOMA VIRUS 16 DNA: NEGATIVE
HUMAN PAPILLOMA VIRUS 18 DNA: NEGATIVE
HUMAN PAPILLOMA VIRUS FINAL DIAGNOSIS: NORMAL
HUMAN PAPILLOMA VIRUS OTHER HR: NEGATIVE

## 2024-04-24 ENCOUNTER — OFFICE VISIT (OUTPATIENT)
Dept: FAMILY MEDICINE | Facility: CLINIC | Age: 39
End: 2024-04-24
Payer: COMMERCIAL

## 2024-04-24 VITALS
SYSTOLIC BLOOD PRESSURE: 114 MMHG | TEMPERATURE: 98.5 F | HEIGHT: 66 IN | WEIGHT: 244.7 LBS | BODY MASS INDEX: 39.32 KG/M2 | OXYGEN SATURATION: 97 % | DIASTOLIC BLOOD PRESSURE: 77 MMHG | RESPIRATION RATE: 16 BRPM | HEART RATE: 85 BPM

## 2024-04-24 DIAGNOSIS — M79.672 LEFT FOOT PAIN: ICD-10-CM

## 2024-04-24 DIAGNOSIS — M79.671 RIGHT FOOT PAIN: ICD-10-CM

## 2024-04-24 DIAGNOSIS — R21 RASH: Primary | ICD-10-CM

## 2024-04-24 RX ORDER — BUPROPION HYDROCHLORIDE 300 MG/1
1 TABLET ORAL EVERY MORNING
COMMUNITY

## 2024-04-24 RX ORDER — BIOTIN 1 MG
1000 TABLET ORAL DAILY
COMMUNITY

## 2024-04-24 RX ORDER — HYDROCORTISONE 2.5 %
CREAM (GRAM) TOPICAL 2 TIMES DAILY
Qty: 30 G | Refills: 0 | Status: SHIPPED | OUTPATIENT
Start: 2024-04-24 | End: 2024-06-17

## 2024-04-24 RX ORDER — GUANFACINE 1 MG/1
1 TABLET, EXTENDED RELEASE ORAL EVERY MORNING
COMMUNITY

## 2024-04-24 RX ORDER — ATOMOXETINE 100 MG/1
100 CAPSULE ORAL DAILY
COMMUNITY

## 2024-04-24 ASSESSMENT — PAIN SCALES - GENERAL: PAINLEVEL: NO PAIN (0)

## 2024-04-24 NOTE — NURSING NOTE
"ROOM:3  DIANA MOSELEY    Preferred Name: Hailey     How did you hear about us?  Other - [facility]     Services Provided? No    38 year old  Chief Complaint   Patient presents with    Derm Problem     Rash on face and legs    Numbness     Feet numbness when standing       Blood pressure 114/77, pulse 85, temperature 98.5  F (36.9  C), temperature source Oral, resp. rate 16, height 1.684 m (5' 6.3\"), weight 111 kg (244 lb 11.2 oz), SpO2 97%. Body mass index is 39.14 kg/m .  BP completed using cuff size:        There is no problem list on file for this patient.      Wt Readings from Last 2 Encounters:   04/24/24 111 kg (244 lb 11.2 oz)   02/23/24 108.9 kg (240 lb)     BP Readings from Last 3 Encounters:   04/24/24 114/77   02/23/24 120/79   02/02/24 111/74       Allergies   Allergen Reactions    Wool Fiber        Current Outpatient Medications   Medication Sig Dispense Refill    acetaminophen (TYLENOL) 500 MG tablet Take 500-1,000 mg by mouth 3 times daily as needed Take 1 to 2 tablets three times a day PRN      atomoxetine (STRATTERA) 100 MG capsule Take 100 mg by mouth daily      biotin 1000 MCG TABS tablet Take 1,000 mcg by mouth daily      buPROPion (WELLBUTRIN XL) 300 MG 24 hr tablet Take 1 tablet by mouth every morning      Cholecalciferol (VITAMIN D3) 50 MCG (2000 UT) CAPS Take 1 capsule by mouth daily      guanFACINE (INTUNIV) 1 MG TB24 24 hr tablet Take 1 mg by mouth every morning      ibuprofen (ADVIL/MOTRIN) 800 MG tablet Take 800 mg by mouth 3 times daily as needed Take 1 tablet by mouth three times a day PRN with food or milk alternate with tylenol      multivitamin, therapeutic (THERA-VIT) TABS tablet Take 1 tablet by mouth daily      naltrexone (DEPADE/REVIA) 50 MG tablet Take 50 mg by mouth daily      atomoxetine (STRATTERA) 40 MG capsule Take 40 mg by mouth daily      buPROPion (WELLBUTRIN XL) 150 MG 24 hr tablet Take 150 mg by mouth every morning      ibuprofen (ADVIL/MOTRIN) 200 MG tablet " "Take 2 tablets (400 mg) by mouth every 6 hours as needed for pain or moderate pain 30 tablet 1    traZODone (DESYREL) 50 MG tablet Take 1-3 tablets by mouth nightly as needed for sleep       No current facility-administered medications for this visit.       Social History     Tobacco Use    Smoking status: Former     Types: Cigarettes    Smokeless tobacco: Never   Vaping Use    Vaping status: Former   Substance Use Topics    Alcohol use: Not Currently    Drug use: Not Currently       Honoring Choices - Health Care Directive Guide offered to patient at time of visit.    Health Maintenance Due   Topic Date Due    ADVANCE CARE PLANNING  Never done    COVID-19 Vaccine (1 - 2023-24 season) Never done       Immunization History   Administered Date(s) Administered    DT (PEDS <7y) 09/01/1998    Flu, Unspecified 11/05/1998    HIB, Unspecified 07/28/1988    Hepatitis B, Peds 02/15/1999    Historical DTP/aP 1986, 1986, 1986, 11/06/1987, 06/12/1991    Historical Hepb 09/01/1998, 11/05/1998    Influenza (H1N1) 11/02/2009    Influenza (IIV3) PF 10/01/2009, 10/24/2013    MMR 07/30/1987, 09/01/1998, 10/22/2021    Polio, Unspecified 1986, 1986, 11/06/1987, 06/12/1991    TD,PF 7+ (Tenivac) 05/19/2005    TDAP (Adacel,Boostrix) 01/21/2010, 11/21/2013, 03/09/2017, 10/04/2019, 10/22/2021       No results found for: \"PAP\"    Recent Labs   Lab Test 02/02/24  1425 01/12/24  1035   LDL 71  --    HDL 53  --    TRIG 127  --    ALT  --  14   CR  --  0.82   GFRESTIMATED  --  >90   ALBUMIN  --  4.1   POTASSIUM  --  4.1           2/2/2024     1:19 PM   PHQ-2 ( 1999 Pfizer)   Q1: Little interest or pleasure in doing things 0   Q2: Feeling down, depressed or hopeless 0   PHQ-2 Score 0   Q1: Little interest or pleasure in doing things Not at all   Q2: Feeling down, depressed or hopeless Not at all   PHQ-2 Score 0           1/12/2024     9:42 AM   PHQ-9 SCORE   PHQ-9 Total Score 10           1/12/2024     9:42 AM " 2/2/2024     1:15 PM   ROSIBEL-7 SCORE   Total Score  2 (minimal anxiety)   Total Score 8 2            No data to display                Bhavana Ricci    April 24, 2024 8:33 AM

## 2024-04-24 NOTE — PROGRESS NOTES
"[facility] FOLLOW UP VISIT    Patient: Hailey Peng YOB: 1985    Date of Exam: 4/24/24    Arrival Time: 08 05 AM  Departure Time: 09 00 AM    Charge Phone (written on paperwork): [phone]    Please be advised that this client resides in a facility in which narcotic medications are not permitted. If pain management is needed, please prescribe an alternative medication.     Hailey Peng is a 38 year old who presents for the following    1,)  Hailey has had a rash on her arms, legs and lower left jaw for one week.  The rash is pruritic.  She is in a group living situation, she feels that other people have rashes but they are different than what she is experiencing.  She was told at one point that she might have acne, but the rash on her face is the same rash she has on her body.  She does not have a fever, she does not have any other symptoms.    2.)  Hailey states that the bottom of her feet are very painful recently.  She stands for a period of time and she feels both pain and numbness.  It is always both feet.  She has tried to change shoes without much change in the sensation.  She generally wears fairly flat shoes.      Do you need any refills on your Medications today? No    Review Of Systems   ROS: 10 point ROS neg other than the symptoms noted above in the HPI.    General Physical Exam:  Vitals: /77 (BP Location: Left arm, Patient Position: Sitting, Cuff Size: Adult Regular)   Pulse 85   Temp 98.5  F (36.9  C) (Oral)   Resp 16   Ht 1.684 m (5' 6.3\")   Wt 111 kg (244 lb 11.2 oz)   SpO2 97%   BMI 39.14 kg/m       Constitutional:   awake, alert, cooperative, no apparent distress, and appears stated age   , Musculoskeletal:   There is no redness, warmth, or swelling of the joints.  Full range of motion noted.  Motor strength is 5 out of 5 all extremities bilaterally.  Tone is normal.  DP of right foot ~ 2+, DP of left foot ~ 1+ both feet cold to " the touch, sensation normal    and Skin:   Noted raised pink very small rash lesions, some excoriated from scratching.  Upper and forearms both, below the knee on both legs, greater on  the left leg, the left jaw.         Additional Comments:N/A    Assessment / Plan:  (R21) Rash  (primary encounter diagnosis)  Comment: the skin looks very dry, instructed as to how to use the cream below  Plan: hydrocortisone 2.5 % cream  Return to clinic in 2 weeks if symptoms persist or worsen    (M79.672) Left foot pain  Comment: consider Raynaud's syndrome, poor circulation   Plan: Keep feet warm, get good support shoes  Follow up in one to 2 weeks prn    (M79.671) Right foot pain  Comment: as above    Referrals Made:   NO REFERRALS MADE TODAY  If a referral was made to a [facility] and you don't get a call from central scheduling please call [phone].  If a Mammogram was ordered for you at [facility] call [phone] to schedule or change your appointment.  If you had an XRay/CT/Ultrasound/MRI ordered the number is [phone] to schedule or change your radiology appointment.     Follow up as needed    Medication changes made at today's visit: MEDICATIONS:        - Continue other medications without change    Amanda Phillips NP

## 2024-06-17 ENCOUNTER — OFFICE VISIT (OUTPATIENT)
Dept: FAMILY MEDICINE | Facility: CLINIC | Age: 39
End: 2024-06-17
Payer: COMMERCIAL

## 2024-06-17 VITALS
HEART RATE: 79 BPM | BODY MASS INDEX: 38.83 KG/M2 | OXYGEN SATURATION: 99 % | TEMPERATURE: 98.9 F | DIASTOLIC BLOOD PRESSURE: 77 MMHG | SYSTOLIC BLOOD PRESSURE: 124 MMHG | WEIGHT: 241.6 LBS | HEIGHT: 66 IN | RESPIRATION RATE: 17 BRPM

## 2024-06-17 DIAGNOSIS — R21 RASH: Primary | ICD-10-CM

## 2024-06-17 RX ORDER — MINERAL OIL/HYDROPHIL PETROLAT
OINTMENT (GRAM) TOPICAL 2 TIMES DAILY PRN
Qty: 99 G | Refills: 1 | Status: SHIPPED | OUTPATIENT
Start: 2024-06-17

## 2024-06-17 RX ORDER — HYDROCORTISONE 2.5 %
CREAM (GRAM) TOPICAL 2 TIMES DAILY
Qty: 30 G | Refills: 0 | Status: SHIPPED | OUTPATIENT
Start: 2024-06-17

## 2024-06-17 RX ORDER — CLONIDINE HYDROCHLORIDE 0.1 MG/1
0.1 TABLET ORAL 3 TIMES DAILY PRN
COMMUNITY
Start: 2024-06-03

## 2024-06-17 RX ORDER — GLYCOPYRROLATE 1 MG/1
TABLET ORAL
COMMUNITY
Start: 2024-06-03

## 2024-06-17 RX ORDER — MINERAL OIL/HYDROPHIL PETROLAT
OINTMENT (GRAM) TOPICAL DAILY PRN
Qty: 99 G | Refills: 1 | Status: SHIPPED | OUTPATIENT
Start: 2024-06-17 | End: 2024-06-17

## 2024-06-17 ASSESSMENT — ENCOUNTER SYMPTOMS
FEVER: 0
CHILLS: 0

## 2024-06-17 ASSESSMENT — PAIN SCALES - GENERAL: PAINLEVEL: NO PAIN (0)

## 2024-06-17 NOTE — NURSING NOTE
"ROOM:1  LARA DUNNE    Preferred Name: Hailey     How did you hear about us?  Other - [facility]     Services Provided? No    39 year old  Chief Complaint   Patient presents with    Derm Problem     Rash all over face and chest, seemed to go away but now it is back and worse, red and itchy, started awhile ago       Blood pressure 124/77, pulse 79, temperature 98.9  F (37.2  C), temperature source Oral, resp. rate 17, height 1.666 m (5' 5.6\"), weight 109.6 kg (241 lb 9.6 oz), last menstrual period 06/01/2024, SpO2 99%. Body mass index is 39.47 kg/m .  BP completed using cuff size:        There is no problem list on file for this patient.      Wt Readings from Last 2 Encounters:   06/17/24 109.6 kg (241 lb 9.6 oz)   04/24/24 111 kg (244 lb 11.2 oz)     BP Readings from Last 3 Encounters:   06/17/24 124/77   04/24/24 114/77   02/23/24 120/79       Allergies   Allergen Reactions    Wool Fiber        Current Outpatient Medications   Medication Sig Dispense Refill    acetaminophen (TYLENOL) 500 MG tablet Take 500-1,000 mg by mouth 3 times daily as needed Take 1 to 2 tablets three times a day PRN      atomoxetine (STRATTERA) 100 MG capsule Take 100 mg by mouth daily      biotin 1000 MCG TABS tablet Take 1,000 mcg by mouth daily      buPROPion (WELLBUTRIN XL) 300 MG 24 hr tablet Take 1 tablet by mouth every morning      Cholecalciferol (VITAMIN D3) 50 MCG (2000 UT) CAPS Take 1 capsule by mouth daily      cloNIDine (CATAPRES) 0.1 MG tablet Take 0.1 mg by mouth 3 times daily as needed      glycopyrrolate (ROBINUL) 1 MG tablet       guanFACINE (INTUNIV) 1 MG TB24 24 hr tablet Take 1 mg by mouth every morning      hydrocortisone 2.5 % cream Apply topically 2 times daily 30 g 0    ibuprofen (ADVIL/MOTRIN) 800 MG tablet Take 800 mg by mouth 3 times daily as needed Take 1 tablet by mouth three times a day PRN with food or milk alternate with tylenol      multivitamin, therapeutic (THERA-VIT) TABS tablet Take 1 tablet " "by mouth daily      naltrexone (DEPADE/REVIA) 50 MG tablet Take 50 mg by mouth daily       No current facility-administered medications for this visit.       Social History     Tobacco Use    Smoking status: Former     Types: Cigarettes    Smokeless tobacco: Never   Vaping Use    Vaping status: Former   Substance Use Topics    Alcohol use: Not Currently    Drug use: Not Currently       Honoring Choices - Health Care Directive Guide offered to patient at time of visit.    Health Maintenance Due   Topic Date Due    ADVANCE CARE PLANNING  Never done    COVID-19 Vaccine (1 - 2023-24 season) Never done       Immunization History   Administered Date(s) Administered    DT (PEDS <7y) 09/01/1998    Flu, Unspecified 11/05/1998    HIB, Unspecified 07/28/1988    Hepatitis B, Peds 02/15/1999    Historical DTP/aP 1986, 1986, 1986, 11/06/1987, 06/12/1991    Historical Hepb 09/01/1998, 11/05/1998    Influenza (H1N1) 11/02/2009    Influenza (IIV3) PF 10/01/2009, 10/24/2013    MMR 07/30/1987, 09/01/1998, 10/22/2021    Polio, Unspecified 1986, 1986, 11/06/1987, 06/12/1991    TD,PF 7+ (Tenivac) 05/19/2005    TDAP (Adacel,Boostrix) 01/21/2010, 11/21/2013, 03/09/2017, 10/04/2019, 10/22/2021       No results found for: \"PAP\"    Recent Labs   Lab Test 02/02/24  1425 01/12/24  1035   LDL 71  --    HDL 53  --    TRIG 127  --    ALT  --  14   CR  --  0.82   GFRESTIMATED  --  >90   ALBUMIN  --  4.1   POTASSIUM  --  4.1           6/17/2024    10:30 AM 2/2/2024     1:19 PM   PHQ-2 ( 1999 Pfizer)   Q1: Little interest or pleasure in doing things 0 0   Q2: Feeling down, depressed or hopeless 0 0   PHQ-2 Score 0 0   Q1: Little interest or pleasure in doing things Not at all Not at all   Q2: Feeling down, depressed or hopeless Not at all Not at all   PHQ-2 Score 0 0           1/12/2024     9:42 AM   PHQ-9 SCORE   PHQ-9 Total Score 10           1/12/2024     9:42 AM 2/2/2024     1:15 PM   ROSIBEL-7 SCORE   Total Score  2 " (minimal anxiety)   Total Score 8 2            No data to display                Kaitlyn Samaniego, EMT    June 17, 2024 10:40 AM

## 2024-06-17 NOTE — PATIENT INSTRUCTIONS
How can you care for yourself at home?  Use moisturizer at least twice a day.  If your doctor prescribes a cream, use it as directed. If your doctor prescribes other medicine, take it exactly as directed.  Wash the affected area with warm (not hot) water only. Soap can make dryness and itching worse. Pat dry.  Apply a moisturizer after washing your hands or after bathing. Use petroleum jelly or a cream such as Cetaphil, Lubriderm, or Moisturel that does not irritate the skin or cause a rash. Apply the cream while your skin is still damp after lightly drying with a towel.  Use cold, wet cloths to reduce itching.  Keep cool, and stay out of the sun.  If itching affects your sleep, ask your doctor if you can take an antihistamine that might reduce itching and make you sleepy, such as diphenhydramine (Benadryl). Be safe with medicines. Read and follow all instructions on the label.  Control scratching. Keep your fingernails trimmed and smooth to prevent damage to the skin when you scratch it. Wearing cotton mittens or gloves can help you stop scratching.  Try to avoid things that trigger your rash. These may include things like allergens, such as pollen or animal dander. Harsh soaps, scratchy clothes, and stress are other examples.

## 2024-06-17 NOTE — PROGRESS NOTES
[facility] FOLLOW UP [facility]    Patient: Hailey Peng YOB: 1985    Date of Exam: 6/17/24    Arrival Time: 10 30 AM  Departure Time: 11 09 AM    Charge Phone (written on paperwork): [phone]    Please be advised that this client resides in a facility in which narcotic medications are not permitted. If pain management is needed, please prescribe an alternative medication.     Hailey Peng is a 39 year old who presents for the following: rash,    HPI:    39-year-old female with past medical history anxiety/depression and methamphetamine use (currently sober in [facility]) presents to clinic to discuss rash.  Patient was previously seen on 4/24/2024 for similar concern (see note from 4/24/24 for full HPI).  She was prescribed hydrocortisone at that time and the rash resolved.  Today, patient reports rash returned to her bilateral upper extremities, chest, and left neck.  She denies fevers or chills. She denies hx eczema but reports FHX as her sister has eczema. No other acute concerns/symptoms at time of exam.        past medical history anxiety/depression and methamphetamine use     Do you need any refills on your Medications today? No    Review Of Systems  Review of Systems   Constitutional:  Negative for chills and fever.   Constitutional, HEENT, cardiovascular, pulmonary, gi and gu systems are negative, except as otherwise noted.        Current Outpatient Medications   Medication Sig Dispense Refill    acetaminophen (TYLENOL) 500 MG tablet Take 500-1,000 mg by mouth 3 times daily as needed Take 1 to 2 tablets three times a day PRN      atomoxetine (STRATTERA) 100 MG capsule Take 100 mg by mouth daily      biotin 1000 MCG TABS tablet Take 1,000 mcg by mouth daily      buPROPion (WELLBUTRIN XL) 300 MG 24 hr tablet Take 1 tablet by mouth every morning      Cholecalciferol (VITAMIN D3) 50 MCG (2000 UT) CAPS Take 1 capsule by mouth daily      cloNIDine (CATAPRES) 0.1 MG tablet  "Take 0.1 mg by mouth 3 times daily as needed      glycopyrrolate (ROBINUL) 1 MG tablet       guanFACINE (INTUNIV) 1 MG TB24 24 hr tablet Take 1 mg by mouth every morning      hydrocortisone 2.5 % cream Apply topically 2 times daily 30 g 0    ibuprofen (ADVIL/MOTRIN) 800 MG tablet Take 800 mg by mouth 3 times daily as needed Take 1 tablet by mouth three times a day PRN with food or milk alternate with tylenol      multivitamin, therapeutic (THERA-VIT) TABS tablet Take 1 tablet by mouth daily      naltrexone (DEPADE/REVIA) 50 MG tablet Take 50 mg by mouth daily       No current facility-administered medications for this visit.         General Physical Exam:  Vitals: /77 (BP Location: Left arm, Patient Position: Sitting, Cuff Size: Adult Regular)   Pulse 79   Temp 98.9  F (37.2  C) (Oral)   Resp 17   Ht 1.666 m (5' 5.6\")   Wt 109.6 kg (241 lb 9.6 oz)   LMP 06/01/2024 (Exact Date)   SpO2 99%   BMI 39.47 kg/m      Physical Exam  Constitutional:       General: She is not in acute distress.     Appearance: She is not ill-appearing.      Comments: Pt declined chaperone.   Cardiovascular:      Rate and Rhythm: Normal rate.   Pulmonary:      Effort: Pulmonary effort is normal. No respiratory distress.   Musculoskeletal:      Cervical back: Neck supple.   Skin:     Findings: Rash present. No abscess. Rash is macular and papular.             Comments: Scant maculopapular lesions to bilateral forearms, upper chest, and left neck.   Neurological:      General: No focal deficit present.      Mental Status: She is alert.   Psychiatric:         Thought Content: Thought content normal.         Judgment: Judgment normal.             Additional Comments:N/A    Assessment / Plan:  1. Rash    Will restart hydrocortisone cream and have patient employ preventative measures for eczema including emollient BID prn.    - hydrocortisone 2.5 % cream; Apply topically 2 times daily Stop after 10 days  Dispense: 30 g; Refill: 0  - " mineral oil-hydrophilic petrolatum (AQUAPHOR) external ointment; Apply topically 2 times daily as needed for dry skin  Dispense: 99 g; Refill: 1      Referrals Made:   NO REFERRALS MADE TODAY  If a referral was made to a [facility] and you don't get a call from central scheduling please call [phone].  If a Mammogram was ordered for you at [facility] call [phone] to schedule or change your appointment.  If you had an XRay/CT/Ultrasound/MRI ordered the number is [phone] to schedule or change your radiology appointment.     Follow up as needed or if symptoms persist/fail to improve.    Medication changes made at today's visit: MEDICATIONS:   Orders Placed This Encounter   Medications                 hydrocortisone 2.5 % cream     Sig: Apply topically 2 times daily Stop after 10 days     Dispense:  30 g     Refill:  0                       mineral oil-hydrophilic petrolatum (AQUAPHOR) external ointment     Sig: Apply topically 2 times daily as needed for dry skin     Dispense:  99 g     Refill:  1          - Continue other medications without change    All questions/concerns addressed. Patient stated understanding/agreement to plan of care.    CHRIS Berg, CNP  [employer]    Note: Chart documentation was done in part with Dragon Voice Recognition software.  Although reviewed after completion, some word and grammatical errors may remain. Please contact author for any clarification or concerns.

## 2024-10-08 ENCOUNTER — OFFICE VISIT (OUTPATIENT)
Dept: FAMILY MEDICINE | Facility: CLINIC | Age: 39
End: 2024-10-08
Payer: COMMERCIAL

## 2024-10-08 VITALS
TEMPERATURE: 98.6 F | WEIGHT: 243.6 LBS | RESPIRATION RATE: 18 BRPM | OXYGEN SATURATION: 98 % | SYSTOLIC BLOOD PRESSURE: 118 MMHG | DIASTOLIC BLOOD PRESSURE: 80 MMHG | HEIGHT: 66 IN | HEART RATE: 92 BPM | BODY MASS INDEX: 39.15 KG/M2

## 2024-10-08 DIAGNOSIS — J02.9 SORE THROAT: Primary | ICD-10-CM

## 2024-10-08 DIAGNOSIS — J30.9 ALLERGIC RHINITIS, UNSPECIFIED SEASONALITY, UNSPECIFIED TRIGGER: ICD-10-CM

## 2024-10-08 DIAGNOSIS — F15.10 METHAMPHETAMINE ABUSE (H): ICD-10-CM

## 2024-10-08 DIAGNOSIS — F41.9 ANXIETY: ICD-10-CM

## 2024-10-08 PROBLEM — F32.A DEPRESSION: Status: ACTIVE | Noted: 2024-10-08

## 2024-10-08 LAB
DEPRECATED S PYO AG THROAT QL EIA: NEGATIVE
GROUP A STREP BY PCR: NOT DETECTED

## 2024-10-08 PROCEDURE — 87651 STREP A DNA AMP PROBE: CPT | Mod: ORL | Performed by: NURSE PRACTITIONER

## 2024-10-08 RX ORDER — LORATADINE 10 MG/1
10 TABLET ORAL DAILY
Qty: 30 TABLET | Refills: 0 | Status: SHIPPED | OUTPATIENT
Start: 2024-10-08 | End: 2024-11-05

## 2024-10-08 ASSESSMENT — PAIN SCALES - GENERAL: PAINLEVEL: SEVERE PAIN (6)

## 2024-10-08 NOTE — NURSING NOTE
"ROOM:1  [facility]    Preferred Name: Hailey     Social Determinants of Health   SDoH screening reviewed today: Yes     Services Provided? No    39 year old  Chief Complaint   Patient presents with    Pharyngitis     Just sore throat for a couple of days  Negative covid test        Blood pressure 118/80, pulse 92, temperature 98.6  F (37  C), temperature source Oral, resp. rate 18, height 1.666 m (5' 5.6\"), weight 110.5 kg (243 lb 9.6 oz), last menstrual period 09/30/2024, SpO2 98%. Body mass index is 39.8 kg/m .  BP completed using cuff size:        There is no problem list on file for this patient.      Wt Readings from Last 2 Encounters:   10/08/24 110.5 kg (243 lb 9.6 oz)   06/17/24 109.6 kg (241 lb 9.6 oz)     BP Readings from Last 3 Encounters:   10/08/24 118/80   06/17/24 124/77   04/24/24 114/77       Allergies   Allergen Reactions    Wool Fiber        Current Outpatient Medications   Medication Sig Dispense Refill    acetaminophen (TYLENOL) 500 MG tablet Take 500-1,000 mg by mouth 3 times daily as needed Take 1 to 2 tablets three times a day PRN      atomoxetine (STRATTERA) 100 MG capsule Take 100 mg by mouth daily      biotin 1000 MCG TABS tablet Take 1,000 mcg by mouth daily      buPROPion (WELLBUTRIN XL) 300 MG 24 hr tablet Take 1 tablet by mouth every morning      Cholecalciferol (VITAMIN D3) 50 MCG (2000 UT) CAPS Take 1 capsule by mouth daily      cloNIDine (CATAPRES) 0.1 MG tablet Take 0.1 mg by mouth 3 times daily as needed      glycopyrrolate (ROBINUL) 1 MG tablet       guanFACINE (INTUNIV) 1 MG TB24 24 hr tablet Take 1 mg by mouth every morning      ibuprofen (ADVIL/MOTRIN) 800 MG tablet Take 800 mg by mouth 3 times daily as needed Take 1 tablet by mouth three times a day PRN with food or milk alternate with tylenol      mineral oil-hydrophilic petrolatum (AQUAPHOR) external ointment Apply topically 2 times daily as needed for dry skin 99 g 1    multivitamin, therapeutic " "(THERA-VIT) TABS tablet Take 1 tablet by mouth daily      naltrexone (DEPADE/REVIA) 50 MG tablet Take 50 mg by mouth daily      hydrocortisone 2.5 % cream Apply topically 2 times daily Stop after 10 days (Patient not taking: Reported on 10/8/2024) 30 g 0     No current facility-administered medications for this visit.       Social History     Tobacco Use    Smoking status: Former     Types: Cigarettes    Smokeless tobacco: Never   Vaping Use    Vaping status: Former   Substance Use Topics    Alcohol use: Not Currently    Drug use: Not Currently       Honoring Choices - Health Care Directive Guide offered to patient at time of visit.    Health Maintenance Due   Topic Date Due    ADVANCE CARE PLANNING  Never done    INFLUENZA VACCINE (1) 09/01/2024    COVID-19 Vaccine (1 - 2024-25 season) Never done       Immunization History   Administered Date(s) Administered    DT (PEDS <7y) 09/01/1998    Flu, Unspecified 11/05/1998    HIB, Unspecified 07/28/1988    Hepatitis B, Peds 02/15/1999    Historical DTP/aP 1985, 1985, 1985, 11/06/1987, 06/12/1991    Historical Hepb 09/01/1998, 11/05/1998    Influenza (H1N1) 11/02/2009    Influenza (IIV3) PF 10/01/2009, 10/24/2013    MMR 07/30/1987, 09/01/1998, 10/22/2021    Polio, Unspecified 1986, 1986, 11/06/1987, 06/12/1991    TD,PF 7+ (Tenivac) 05/19/2005    TDAP (Adacel,Boostrix) 01/21/2010, 11/21/2013, 03/09/2017, 10/04/2019, 10/22/2021       No results found for: \"PAP\"    Recent Labs   Lab Test 02/02/24  1425 01/12/24  1035   LDL 71  --    HDL 53  --    TRIG 127  --    ALT  --  14   CR  --  0.82   GFRESTIMATED  --  >90   ALBUMIN  --  4.1   POTASSIUM  --  4.1           10/8/2024     3:11 PM 6/17/2024    10:30 AM   PHQ-2 ( 1999 Pfizer)   Q1: Little interest or pleasure in doing things 0 0   Q2: Feeling down, depressed or hopeless 0 0   PHQ-2 Score 0 0   Q1: Little interest or pleasure in doing things Not at all Not at all   Q2: Feeling down, depressed or " hopeless Not at all Not at all   PHQ-2 Score 0 0           1/12/2024     9:42 AM   PHQ-9 SCORE   PHQ-9 Total Score 10           1/12/2024     9:42 AM 2/2/2024     1:15 PM   ROSIBEL-7 SCORE   Total Score  2 (minimal anxiety)   Total Score 8 2            No data to display                Kaitlyn Samaniego, EMT    October 8, 2024 3:18 PM

## 2024-10-08 NOTE — PATIENT INSTRUCTIONS
Sore Throat  -Likely early upper respiratory infection or allergies  -Rapid strep test negative, will notify of culture results by tomorrow  -Anti-histamine: Claritin 10mg daily   -Hydration and rest   -Tylenol and ibuprofen   -Return if fever/aches/chills or worsening symptoms

## 2024-10-09 ENCOUNTER — TELEPHONE (OUTPATIENT)
Dept: FAMILY MEDICINE | Facility: CLINIC | Age: 39
End: 2024-10-09

## 2024-10-09 NOTE — TELEPHONE ENCOUNTER
----- Message from Janina Manriquez sent at 10/9/2024  8:03 AM CDT -----  Dear Ms. Peng,  Your results are within expected range. Please call if you have any questions regarding these results or plan of care.   Please let [facility]/patient know strep culture negative. Thank you  Janina PEREZ CNP

## 2024-10-10 ENCOUNTER — TELEPHONE (OUTPATIENT)
Dept: FAMILY MEDICINE | Facility: CLINIC | Age: 39
End: 2024-10-10

## 2024-11-01 DIAGNOSIS — J30.9 ALLERGIC RHINITIS, UNSPECIFIED SEASONALITY, UNSPECIFIED TRIGGER: ICD-10-CM

## 2024-11-05 RX ORDER — LORATADINE 10 MG/1
1 TABLET ORAL DAILY
Qty: 30 TABLET | Refills: 0 | Status: SHIPPED | OUTPATIENT
Start: 2024-11-05

## 2024-12-02 DIAGNOSIS — J30.9 ALLERGIC RHINITIS, UNSPECIFIED SEASONALITY, UNSPECIFIED TRIGGER: ICD-10-CM

## 2024-12-04 RX ORDER — LORATADINE 10 MG/1
1 TABLET ORAL DAILY
Qty: 30 TABLET | Refills: 0 | OUTPATIENT
Start: 2024-12-04

## 2024-12-04 NOTE — TELEPHONE ENCOUNTER
Refused request - no longer under provider care.    [facility] has closed effective date 10/31/24. Patient no longer under provider care, care must be established elsewhere.

## 2024-12-06 DIAGNOSIS — J30.9 ALLERGIC RHINITIS, UNSPECIFIED SEASONALITY, UNSPECIFIED TRIGGER: ICD-10-CM

## 2024-12-10 RX ORDER — LORATADINE 10 MG/1
1 TABLET ORAL DAILY
Qty: 30 TABLET | Refills: 0 | Status: SHIPPED | OUTPATIENT
Start: 2024-12-10